# Patient Record
Sex: FEMALE | Race: WHITE | NOT HISPANIC OR LATINO | Employment: FULL TIME | URBAN - METROPOLITAN AREA
[De-identification: names, ages, dates, MRNs, and addresses within clinical notes are randomized per-mention and may not be internally consistent; named-entity substitution may affect disease eponyms.]

---

## 2019-09-05 ENCOUNTER — HOSPITAL ENCOUNTER (OUTPATIENT)
Dept: OBGYN | Facility: HOSPITAL | Age: 29
Discharge: ANOTHER HOSPITAL | End: 2019-09-05
Attending: OBSTETRICS & GYNECOLOGY | Admitting: OBSTETRICS & GYNECOLOGY

## 2019-09-05 ASSESSMENT — MIFFLIN-ST. JEOR: SCORE: 1654.83

## 2019-11-08 ENCOUNTER — ANESTHESIA - HEALTHEAST (OUTPATIENT)
Dept: OBGYN | Facility: CLINIC | Age: 29
End: 2019-11-08

## 2019-11-10 ENCOUNTER — HOME CARE/HOSPICE - HEALTHEAST (OUTPATIENT)
Dept: HOME HEALTH SERVICES | Facility: HOME HEALTH | Age: 29
End: 2019-11-10

## 2019-11-11 ENCOUNTER — HOME CARE/HOSPICE - HEALTHEAST (OUTPATIENT)
Dept: HOME HEALTH SERVICES | Facility: HOME HEALTH | Age: 29
End: 2019-11-11

## 2019-11-16 ENCOUNTER — AMBULATORY - HEALTHEAST (OUTPATIENT)
Dept: PEDIATRICS | Facility: CLINIC | Age: 29
End: 2019-11-16

## 2021-05-01 ENCOUNTER — HEALTH MAINTENANCE LETTER (OUTPATIENT)
Age: 31
End: 2021-05-01

## 2021-05-26 VITALS
DIASTOLIC BLOOD PRESSURE: 74 MMHG | SYSTOLIC BLOOD PRESSURE: 120 MMHG | HEART RATE: 86 BPM | TEMPERATURE: 98.4 F | RESPIRATION RATE: 18 BRPM

## 2021-06-03 VITALS — HEIGHT: 65 IN | WEIGHT: 207 LBS | BODY MASS INDEX: 34.49 KG/M2

## 2021-06-03 NOTE — ANESTHESIA PREPROCEDURE EVALUATION
Anesthesia Evaluation      Patient summary reviewed   No history of anesthetic complications     Airway   Mallampati: II  Neck ROM: full   Pulmonary - negative ROS   (-) not a smoker                         Cardiovascular - negative ROS  Exercise tolerance: > or = 4 METS   Neuro/Psych - negative ROS   (+) anxiety/panic attacks,     Endo/Other    (+) obesity, pregnant     Comments: Scoliosis.    GI/Hepatic/Renal - negative ROS           Dental                         Anesthesia Plan  Planned anesthetic: epidural  LE  ASA 3     Anesthetic plan and risks discussed with: patient and spouse    Post-op plan: routine recovery

## 2021-06-03 NOTE — ANESTHESIA PROCEDURE NOTES
Epidural Block    Patient location during procedure: OB  Time Called: 11/8/2019 1:00 PM  Reason for Block:labor epidural  Staffing:  Performing  Anesthesiologist: Jihan Coulter MD  Preanesthetic Checklist  Completed: patient identified, risks, benefits, and alternatives discussed, timeout performed, consent obtained, at patient's request, airway assessed, oxygen available, suction available, emergency drugs available and hand hygiene performed  Procedure  Patient position: sitting  Prep: ChloraPrep  Patient monitoring: continuous pulse oximetry, heart rate and blood pressure  Approach: midline  Location: L3-L4  Injection technique: YELITZA air  Number of Attempts:2  Needle  Needle type: Hook Mobilescarlett   Needle gauge: 18 G     Catheter in Space: 6  Assessment  Sensory level:  No complications      Additional Notes:  Patient was increasingly uncomfortable on L&D. Offered to replace epidural with the understanding that the replacement may work better, worse, or no change. RBA discussed again. Patient expressed understanding and wanted to try another epidural. Original epidural pulled with tip intact. Initial attempt L5/S1 not successful. L3-L4 was ultimately successful. Challenging epidural as patient has known scoliosis and back is overtly twisted.

## 2021-06-03 NOTE — ANESTHESIA PROCEDURE NOTES
Epidural Block    Patient location during procedure: OB  Time Called: 11/8/2019 4:48 AM  Reason for Block:labor epidural  Staffing:  Performing  Anesthesiologist: Bjorn Ramírez MD  Preanesthetic Checklist  Completed: patient identified, risks, benefits, and alternatives discussed, timeout performed, consent obtained, at patient's request, airway assessed, oxygen available, suction available, emergency drugs available and hand hygiene performed  Procedure  Patient position: sitting  Prep: ChloraPrep  Patient monitoring: continuous pulse oximetry, heart rate and blood pressure  Approach: midline  Location: L3-L4  Injection technique: YELITZA saline  Number of Attempts:1  Needle  Needle type: OpenLogicscarlett   Needle gauge: 18 G     Catheter in Space: 5 (8 cm to EDS)  Assessment  Sensory level:  No complications      Additional Notes:  By history and imaging, 42 degrees of lumbar and thoracic scoliosis.

## 2021-06-03 NOTE — ANESTHESIA POSTPROCEDURE EVALUATION
Patient: Emiliana Prakash  * No procedures listed *  Anesthesia type: No value filed.    Patient location: PACU  Last vitals: No vitals data found for the desired time range.    Post vital signs: stable  Level of consciousness: awake and responds to simple questions  Post-anesthesia pain: pain controlled  Post-anesthesia nausea and vomiting: no  Pulmonary: unassisted, return to baseline  Cardiovascular: stable and blood pressure at baseline  Hydration: adequate  Anesthetic events: no    QCDR Measures:  ASA# 11 - Crystal-op Cardiac Arrest: ASA11B - Patient did NOT experience unanticipated cardiac arrest  ASA# 12 - Crystal-op Mortality Rate: ASA12B - Patient did NOT die  ASA# 13 - PACU Re-Intubation Rate: ASA13B - Patient did NOT require a new airway mgmt  ASA# 10 - Composite Anes Safety: ASA10A - No serious adverse event    Additional Notes:

## 2021-06-03 NOTE — PROGRESS NOTES
"[facility] Lactation Visit    Assessment:    1.  difficulty in feeding at breast       Carla was very sleepy in the office today and was not motivated to eat. She attempted to latch both with and without a nipple shield but she was not rooting or opening her mouth. Discussed with parents that they may want to make another appointment to try and catch her when she is more awake and we can try again to get her to latch on. Her growth over the past 5 days has been appropriate and she is back to her birth weight. Mom also pumped in the office today and was able to pump 80 ml which is appropriate for 8 days post partum. I believe that with more consistent nursing/ pumping she will make a full milk supply. Mom also somewhat overwhelmed today as attempting to nurse and pump has been very challenging; encouraged her to look at the \"big picture\" and she can reassess in a few days what her goals are and what makes sense for her in terms of breastfeeding/ pumping.       Plan:    Continue to offer the breast as often as is right for you. You can try holding her skin to skin when she's getting close to a feeding time. When she starts showing cues you can move her into the nursing position and then make a breast \"sandwich\". Use the nipple shield as needed. You can start without it and then add it in as needed.     Offer both sides every time, and alternate which breast you start on. Latch baby deeply by making a \"breast sandwich,\" and aim your nipple for the roof of the mouth. If baby's lips are rolled inward, flip the top lip out with your finger, and then apply gentle downward pressure to the chin to help the lips flange out like \"fish lips.\" If you have pain that lasts beyond the initial latch-on, always restart. When sucking/swallowing frequency starts to slow down, do breast compressions/massage and tickle baby's feet to keep them alert with feeding. A diaper change between sides can be helpful to keep " "them alert.    Supplementation plan: Her weight gain has been appropriate, continue to offer her bottles of expressed breast milk or formula as needed based on her cues.         Recommended to pump: For a full milk supply, try to have breast stimulation by nursing or pumping 8 times per day - balance this with other priorities like resting and taking care of yourself.   Continue to monitor output, expect at least 6 wet diapers per day.   Recommended Vitamin D 400 IU daily.        Follow up as needed for another lactation appointment and we can try to get her to latch on when she's more awake. With pediatrician this week for weight check.     Average Infant Milk Intake by Age    Age Average milk volume per feeding (mL) Average milk volume per feeding (oz) Average 24 hour milk intake (mL) Average 24 hour milk intake (oz)   Day 1 Few drops - 5mL < tsp Up to 30 mL Up to 1 oz   Day 2 5 - 15 mL <0.5 oz - 1 TB 30 - 120 mL 1 - 4 oz   Day 3 15 - 30 mL  0.5 - 1 oz 120 - 240 mL 4 - 8 oz   Day 4 30 - 45 mL  1 - 1.5 oz 240 - 360 mL 8 - 12 oz   Day 5-7 45 - 60 mL 1.5 - 2 oz 360 - 600 mL 12 - 18 oz   Week 2-3 60 - 90 mL 2 - 3 oz 450 - 750 mL 15 - 25 oz   Months 1-6 90 - 150 mL 3 - 5 oz 750 - 1035 mL 25 - 35 oz         Tips for Exclusive Pumping:     -Pump 8-12x/day to \"dial in your order\" until your milk supply regulates, usually this occurs between 6 and 12 weeks. The interval of time between pump sessions is less important than the total number of times per day that you pump.     -To create and maintain a robust milk supply, pump at least once overnight. You can drink 2 big glasses of water before you go to sleep so that your bladder will wake you up. Pump after you get up to go to the bathroom.     -Once your milk supply regulates, you can try dropping pump sessions and still maintain your milk supply. You can try dropping one pump per month and see how it goes. The following chart below can help you determine how many times " "per day you need to pump in order to maintain your milk supply, AFTER your supply has regulated.    How many times per day do I need to pump?      Smallest Capacity Small Capacity Medium Capacity Large Capacity Largest Capacity   Max pump yield (if you make =>) 1-2 oz per pump 2-3 oz per pump 3-5 oz per pump 5-9 oz per pump 10-12 oz per pump   To increase milk pump => >12 x/day 10-11 x/day 8-10x/day 6-8x/day 4-5x/day   To maintain milk pump => 8x/day 7x/day 6x/day 5x/day 3-4x/day   To decrease milk pump=> 7x/day 6x/day 4-5x/day 3x/day 2x/day   Max time between pumping 4-5 hours 6-7 hours 7-8 hours 8-10 hours 10-12 hours     -Signs your milk supply has regulated: Breasts feel \"soft\" or \"empty,\" breasts stop leaking between nursing or pump sessions, you stop feeling let-downs or feel them less (though some women never feel them and that is normal). This is a sign that your milk supply is switching from being hormonally driven to being driven by autocrine control (supply and demand - driven by breast emptying).     -To encourage your body to make a good amount of milk, pump until your breasts are empty. This may take several letdowns. Some women find they need to pump for 30 minutes + to fully empty their breasts.     -To cut down on dishes, you can rinse your pump parts after pumping and them in a bag or tupperware container and store them in the fridge between pumping sessions, washing them well twice per day.     -There are hands-free pumping bras available that can hold your pump parts in place. This way you can be hands-free while you pump, or you can do hands-on pumping with good massage.     -There are several portable pumps available that can allow you to move about while you pump. Baby Buddha is a good one, though there are many other options. Freemies are a bottle and flange in one that you can wear under your shirt and pump discreetly. Freemies can either be closed system or open system; make sure you buy " the correct one for the portable pump you own. Medela pumps are open system, baby buddha and spectra s9 are closed system.     [email or web address]  [email or web address]    -You can use coconut oil to lubricate the inside of your pump flanges to decrease friction with pumping. If you are persistently sore with pumping, however, be sure that you are using the correct size flange.           SUBJECTIVE:     Carla is here today with mom and dad, Emiliana and Mohan, for lactation support. She is a 8 days female born at Gestational Age: 39w3d now 8 days.    She is doing well. She has gained 4.5 oz since last visit 5 days ago. She has gained approximately 1 oz per day over the past 5 days and is now 0% from birth weight.   .    Baby is attempting nursing 4-6 times per day. She last latched on two days ago but has had difficulty latching since birth.    Mother reports not being sure if she's hearing audible swallows.   Baby feeds about 8-10 times in 24 hours.   Baby is supplemented with expressed breast milk or formula, about 60-80 ml per feeding.   Mom is also pumping about 4-6 per day and gets about 40-60 ml per pumping session.  Number of wet diapers in 24 hours: 8+  Number of stools in 24 hours: 4  Color and consistency of stools: brown/yellow, seedy  Mom noticed her breasts grew larger and areolas darkened during pregnancy and she noticed primary engorgement when her milk came in on day 5.      Breastfeeding Goals: More nursing, less pumping.     Previous Breastfeeding Experience: First baby  Breast-surgery:  No   Maternal medications: Iron, mom had a hemorrhage at delivery, colace, ibuprofen and tylenol.     Hospital course:  NICU for rule out chorio for 2 days.     Results for orders placed or performed during the hospital encounter of 11/08/19   Culture, Blood Pediatric   Result Value Ref Range    Aerobic Blood Culture Bottle No Growth No Growth, No organisms seen, bottle returned to instrument, Specimen  not received, No Growth at 24 hours, No Growth at 120 hours, No Growth at 48 hours, No Growth at 72 hours, No Growth at 96 hours   Glucose, Serum   Result Value Ref Range    Glucose 68 44 - 98 mg/dL    Patient Fasting > 8hrs? Yes    [place] Metabolic Screen-prior to first transfusion/transport/on or before 3rd day of life.   Result Value Ref Range    Scan Result See Scanned Report    HM1 (CBC with Diff)   Result Value Ref Range    WBC 20.8 9.0 - 35.0 thou/uL    RBC 4.70 4.00 - 6.60 mill/uL    Hemoglobin 17.1 14.5 - 22.5 g/dL    Hematocrit 49.8 45.0 - 67.0 %     95 - 121 fL    MCH 36.4 31.0 - 37.0 pg    MCHC 34.3 29.0 - 37.0 g/dL    RDW 18.6 (H) 12.0 - 18.0 %    Platelets 251 140 - 440 thou/uL    MPV 8.9 8.5 - 12.5 fL   Manual Differential   Result Value Ref Range    Total Neutrophils % 62 32 - 62 %    Lymphocytes % 34 (H) 19 - 29 %    Monocytes % 3 (L) 5 - 7 %    Eosinophils %  0 0 - 2 %    Basophils % 1 0 - 1 %    Total Neutrophils Absolute 12.9 3.0 - 28.0 thou/ul    Lymphocytes Absolute 7.1 2.0 - 10.0 thou/uL    Monocytes Absolute 0.6 0.5 - 2.5 thou/uL    Eosinophils Absolute 0.0 0.0 - 0.7 thou/uL    Basophils Absolute 0.2 0.0 - 0.4 thou/uL    Manual nRBC per 100 Cells 5 0 - 10    Platelet Estimate Normal Normal   POCT Glucose - 90 minutes of life   Result Value Ref Range    Glucose 69 51 - 139 mg/dL       Current Outpatient Medications:      cholecalciferol, vitamin D3, 10 mcg/mL (400 unit/mL) Drop drops, Take 400 Units by mouth., Disp: , Rfl:   History reviewed. No pertinent past medical history.  History reviewed. No pertinent surgical history.  History reviewed. No pertinent family history.      Primary care provider: Chantal Hurtado DO    OBJECTIVE:    Mother:   Nipples are everted, the areola is compressible, the breast is soft and full.     Sore nipples: None   Maternal depression screening: Referral to maternal PCP  EPDS: Referral to maternal PCP made  Mom will follow up in 2 weeks if not feeling  better    Infant:     Age today: 8 days    Vitals:    11/16/19 1018   Pulse: 152   Resp: 48   Temp: 99  F (37.2  C)   SpO2: 99%         Weight:   Wt Readings from Last 3 Encounters:   11/16/19 7 lb 10.4 oz (3.47 kg) (49 %, Z= -0.03)*   11/11/19 7 lb 6 oz (3.345 kg) (52 %, Z= 0.04)*   11/10/19 7 lb 8.1 oz (3.404 kg) (59 %, Z= 0.23)*     * Growth percentiles are based on WHO (Girls, 0-2 years) data.       Birthweight:  7 lb 10.8 oz (3.48 kg).   Today's weight:    Vitals:    11/16/19 1018   Weight: 7 lb 10.4 oz (3.47 kg)   . This is 0% from birth weight.         Test weights:   No latch achieved    Feeding assessment:     Digital suck assessment:  Infant draws consultant's finger into mouth, palate intact, tongue over gums, normal frenulum.     PHYSICAL EXAM    Gen: Alert, no acute distress.   Head: Anterior fontanelle flat and soft.   Mouth:Lips pink. Oral mucosa moist. Tongue midline (good lateralization, movement, and lift; able to extend pass lower gumline).  Palate intact. Coordinated suck.  Lungs: Clear to auscultation bilaterally.   Cardiac: Regular regular rate and rhythm, S1S2, no murmurs.  Abdomen: Soft, nontender, bowel sounds present, no hepatosplenomegaly or mass palpable. Umbilicus dry with no erythema or drainage.   : Domignuez stage 1 female genitalia  Skin: Intact, dry, appropriate coloring for ethnicity, no jaundice.   Neuro: Appropriate muscle tone.    The visit lasted a total of 60 minutes that I spent face to face with the patient. Of that time, 60 minutes were spent on lactation. Over 50% of the time was spent counseling and educating the patient about feeding difficulties and lactation concerns.     Completed by:   JUAN Olmstead, IBCLC, [facility], Pediatrics.  11/16/2019 9:02 AM

## 2021-07-03 NOTE — ADDENDUM NOTE
Addendum Note by Jihan Coulter MD at 11/25/2019 10:15 PM     Author: Jihan Coulter MD Service: -- Author Type: Physician    Filed: 11/25/2019 10:15 PM Date of Service: 11/25/2019 10:15 PM Status: Signed    : Jihan Coulter MD (Physician)       Addendum  created 11/25/19 2215 by Jihan Coulter MD    Attestation recorded in Intraprocedure, Child order released for a procedure order, Intraprocedure Attestations filed, Intraprocedure Staff edited, Order Canceled from Note

## 2021-07-14 PROBLEM — Z34.90 PREGNANT: Status: RESOLVED | Noted: 2019-11-08 | Resolved: 2019-11-10

## 2021-10-11 ENCOUNTER — HEALTH MAINTENANCE LETTER (OUTPATIENT)
Age: 31
End: 2021-10-11

## 2021-12-30 LAB
ABO (EXTERNAL): NORMAL
HEMOGLOBIN (EXTERNAL): 13.7 G/DL (ref 9–14)
HEPATITIS B SURFACE ANTIGEN (EXTERNAL): NEGATIVE
HIV1+2 AB SERPL QL IA: NEGATIVE
RH (EXTERNAL): POSITIVE
RUBELLA ANTIBODY IGG (EXTERNAL): NORMAL
TREPONEMA PALLIDUM ANTIBODY (EXTERNAL): NEGATIVE

## 2022-05-22 ENCOUNTER — HEALTH MAINTENANCE LETTER (OUTPATIENT)
Age: 32
End: 2022-05-22

## 2022-07-15 LAB — GROUP B STREPTOCOCCUS (EXTERNAL): NEGATIVE

## 2022-08-02 DIAGNOSIS — Z34.90 ENCOUNTER FOR INDUCTION OF LABOR: Primary | ICD-10-CM

## 2022-08-04 ENCOUNTER — HOSPITAL ENCOUNTER (OUTPATIENT)
Facility: HOSPITAL | Age: 32
Discharge: HOME | End: 2022-08-05
Attending: REGISTERED NURSE | Admitting: ADVANCED PRACTICE MIDWIFE
Payer: COMMERCIAL

## 2022-08-04 RX ORDER — ASPIRIN 81 MG/1
81 TABLET ORAL DAILY
Status: ON HOLD | COMMUNITY
End: 2022-08-07

## 2022-08-04 RX ORDER — CITALOPRAM HYDROBROMIDE 20 MG/1
20 TABLET ORAL DAILY
COMMUNITY

## 2022-08-04 RX ORDER — PRENATAL VIT/IRON FUM/FOLIC AC 27MG-0.8MG
1 TABLET ORAL DAILY
COMMUNITY

## 2022-08-05 VITALS
RESPIRATION RATE: 18 BRPM | WEIGHT: 223 LBS | DIASTOLIC BLOOD PRESSURE: 85 MMHG | TEMPERATURE: 98.2 F | BODY MASS INDEX: 37.15 KG/M2 | SYSTOLIC BLOOD PRESSURE: 137 MMHG | HEIGHT: 65 IN

## 2022-08-05 PROBLEM — Z36.89 ENCOUNTER FOR TRIAGE IN PREGNANT PATIENT: Status: ACTIVE | Noted: 2022-08-05

## 2022-08-05 PROCEDURE — 250N000013 HC RX MED GY IP 250 OP 250 PS 637: Performed by: ADVANCED PRACTICE MIDWIFE

## 2022-08-05 PROCEDURE — G0463 HOSPITAL OUTPT CLINIC VISIT: HCPCS

## 2022-08-05 RX ORDER — LIDOCAINE 40 MG/G
CREAM TOPICAL
Status: DISCONTINUED | OUTPATIENT
Start: 2022-08-05 | End: 2022-08-05 | Stop reason: HOSPADM

## 2022-08-05 RX ORDER — HYDROXYZINE HYDROCHLORIDE 50 MG/1
100 TABLET, FILM COATED ORAL ONCE
Status: COMPLETED | OUTPATIENT
Start: 2022-08-05 | End: 2022-08-05

## 2022-08-05 RX ADMIN — HYDROXYZINE HYDROCHLORIDE 100 MG: 50 TABLET ORAL at 01:30

## 2022-08-05 NOTE — DISCHARGE INSTRUCTIONS
Discharge Instruction for Undelivered Patients      You were seen for: Labor Assessment  We Consulted: Smita Joseph CNM   You had (Test or Medicine):cervical exam    Diet:   Drink 8 to 12 glasses of liquids (milk, juice, water) every day.     Activity:  Count fetal kicks everyday (see handout)     Call your provider if you notice:  Swelling in your face or increased swelling in your hands or legs.  Headaches that are not relieved by Tylenol (acetaminophen).  Changes in your vision (blurring: seeing spots or stars.)  Nausea (sick to your stomach) and vomiting (throwing up).   Weight gain of 5 pounds or more per week.  Heartburn that doesn't go away.  Signs of bladder infection: pain when you urinate (use the toilet), need to go more often and more urgently.  The bag of stroud (rupture of membranes) breaks, or you notice leaking in your underwear.  Bright red blood in your underwear.  Abdominal (lower belly) or stomach pain.  For first baby: Contractions (tightening) less than 5 minutes apart for one hour or more.  Second (plus) baby: Contractions (tightening) less than 10 minutes apart and getting stronger.  *If less than 34 weeks: Contractions (tightening) more than 6 times in one hour.  Increase or change in vaginal discharge (note the color and amount)    Follow-up:  {OB DC INST NOT ADMITTED OTHER:[id number]}

## 2022-08-05 NOTE — PROGRESS NOTES
Patient arrived to [facility] with her  at 2320 to r/o labor. She stated that contractions started around 1500 and were every 8 minutes apart. Upon arrival patient was placed on the monitor, category 1 tracing with contractions every 3-6 minutes palpating mild. Midwife on call updated on patient's arrival. Plan is to either discharge patient to home if she so chooses or recheck her cervix in 1-2 hours and discharge if no changes. At this point the patient wishes to walk around unit and have her cervix rechecked.

## 2022-08-05 NOTE — PROGRESS NOTES
"Outpatient/Triage Note:    Patient Name:  Emiliana Prakash  :      1990  MRN:      [id number]    Subjective:  Emiliana Prakash is a 32 year old G2*P1 at 40.1 weeks, who presented to [facility] (was diverted from ) for evaluation of contractions. Denies leaking of fluid, bleeding, or changes in fetal movement.     Objective:  Vital signs: Ht 1.651 m (5' 5\")   Wt 101.2 kg (223 lb)   BMI 37.11 kg/m    FHR: Category 1  Uterine contractions: Q3-6  NST: reactive    Physical Exam: A&Ox3  Abdomen: SIUP  SVE: 1.5/70/-2 per RN, same exam in clinic   Legs:moves freely    Results:  No results found for this or any previous visit (from the past 168 hour(s)).    Assessment:   @ 40w1d here for evaluation of contractions.     Plan:   -Offered recheck cervix in 1-2 hours vs discharge home with therapeutic rest, desires ambulation x 1-2 hours and reevaluating.   -No change on 2nd SVE. Discharge to home undelivered with therapeutic rest. Reviewed warning signs including decreased fetal movement, leaking of fluid, vaginal bleeding, or signs of labor. Reviewed how to contact on-call provider. Follow-up in clinic with OB provider as scheduled or sooner as needed. All questions answered. Agrees with plan.     Provider: CUATE Yanes CNM     "

## 2022-08-05 NOTE — PROGRESS NOTES
Patient's cervix unchanged on recheck. Vistaril given per orders and discharge instructions given, questions answered and understanding verbalized.

## 2022-08-06 ENCOUNTER — HOSPITAL ENCOUNTER (INPATIENT)
Facility: CLINIC | Age: 32
LOS: 1 days | Discharge: HOME | End: 2022-08-07
Attending: REGISTERED NURSE | Admitting: REGISTERED NURSE
Payer: COMMERCIAL

## 2022-08-06 PROBLEM — Z34.90 TERM PREGNANCY: Status: ACTIVE | Noted: 2022-08-06

## 2022-08-06 LAB
ABO/RH(D): NORMAL
ANTIBODY SCREEN: NEGATIVE
APTT PPP: 26 SECONDS (ref 22–38)
BASOPHILS # BLD AUTO: 0 10E3/UL (ref 0–0.2)
BASOPHILS NFR BLD AUTO: 0 %
D DIMER PPP FEU-MCNC: 2.76 UG/ML FEU (ref 0–0.5)
EOSINOPHIL # BLD AUTO: 0 10E3/UL (ref 0–0.7)
EOSINOPHIL NFR BLD AUTO: 0 %
ERYTHROCYTE [DISTWIDTH] IN BLOOD BY AUTOMATED COUNT: 14.2 % (ref 10–15)
FIBRINOGEN PPP-MCNC: 450 MG/DL (ref 170–490)
HCT VFR BLD AUTO: 36.3 % (ref 35–47)
HGB BLD-MCNC: 10.7 G/DL (ref 11.7–15.7)
HGB BLD-MCNC: 12 G/DL (ref 11.7–15.7)
HOLD SPECIMEN: NORMAL
IMM GRANULOCYTES # BLD: 0.1 10E3/UL
IMM GRANULOCYTES NFR BLD: 0 %
INR PPP: 1.05 (ref 0.85–1.15)
LYMPHOCYTES # BLD AUTO: 1.7 10E3/UL (ref 0.8–5.3)
LYMPHOCYTES NFR BLD AUTO: 12 %
MCH RBC QN AUTO: 28.2 PG (ref 26.5–33)
MCHC RBC AUTO-ENTMCNC: 33.1 G/DL (ref 31.5–36.5)
MCV RBC AUTO: 85 FL (ref 78–100)
MONOCYTES # BLD AUTO: 1.1 10E3/UL (ref 0–1.3)
MONOCYTES NFR BLD AUTO: 8 %
NEUTROPHILS # BLD AUTO: 11.3 10E3/UL (ref 1.6–8.3)
NEUTROPHILS NFR BLD AUTO: 80 %
NRBC # BLD AUTO: 0 10E3/UL
NRBC BLD AUTO-RTO: 0 /100
PLATELET # BLD AUTO: 341 10E3/UL (ref 150–450)
PLATELET # BLD AUTO: 369 10E3/UL (ref 150–450)
RBC # BLD AUTO: 4.25 10E6/UL (ref 3.8–5.2)
SARS-COV-2 RNA RESP QL NAA+PROBE: NEGATIVE
SPECIMEN EXPIRATION DATE: NORMAL
WBC # BLD AUTO: 14.2 10E3/UL (ref 4–11)

## 2022-08-06 PROCEDURE — 86780 TREPONEMA PALLIDUM: CPT | Performed by: REGISTERED NURSE

## 2022-08-06 PROCEDURE — 85049 AUTOMATED PLATELET COUNT: CPT | Performed by: REGISTERED NURSE

## 2022-08-06 PROCEDURE — 85018 HEMOGLOBIN: CPT | Performed by: REGISTERED NURSE

## 2022-08-06 PROCEDURE — 85384 FIBRINOGEN ACTIVITY: CPT | Performed by: REGISTERED NURSE

## 2022-08-06 PROCEDURE — 999N000016 HC STATISTIC ATTENDANCE AT DELIVERY

## 2022-08-06 PROCEDURE — 120N000001 HC R&B MED SURG/OB

## 2022-08-06 PROCEDURE — 250N000013 HC RX MED GY IP 250 OP 250 PS 637: Performed by: REGISTERED NURSE

## 2022-08-06 PROCEDURE — 36415 COLL VENOUS BLD VENIPUNCTURE: CPT | Performed by: REGISTERED NURSE

## 2022-08-06 PROCEDURE — 999N000157 HC STATISTIC RCP TIME EA 10 MIN

## 2022-08-06 PROCEDURE — C9803 HOPD COVID-19 SPEC COLLECT: HCPCS

## 2022-08-06 PROCEDURE — 85025 COMPLETE CBC W/AUTO DIFF WBC: CPT | Performed by: REGISTERED NURSE

## 2022-08-06 PROCEDURE — 85379 FIBRIN DEGRADATION QUANT: CPT | Performed by: REGISTERED NURSE

## 2022-08-06 PROCEDURE — 250N000011 HC RX IP 250 OP 636: Performed by: REGISTERED NURSE

## 2022-08-06 PROCEDURE — 10907ZC DRAINAGE OF AMNIOTIC FLUID, THERAPEUTIC FROM PRODUCTS OF CONCEPTION, VIA NATURAL OR ARTIFICIAL OPENING: ICD-10-PCS | Performed by: REGISTERED NURSE

## 2022-08-06 PROCEDURE — 722N000001 HC LABOR CARE VAGINAL DELIVERY SINGLE

## 2022-08-06 PROCEDURE — 85730 THROMBOPLASTIN TIME PARTIAL: CPT | Performed by: REGISTERED NURSE

## 2022-08-06 PROCEDURE — 0HQ9XZZ REPAIR PERINEUM SKIN, EXTERNAL APPROACH: ICD-10-PCS | Performed by: REGISTERED NURSE

## 2022-08-06 PROCEDURE — 86850 RBC ANTIBODY SCREEN: CPT | Performed by: REGISTERED NURSE

## 2022-08-06 PROCEDURE — U0003 INFECTIOUS AGENT DETECTION BY NUCLEIC ACID (DNA OR RNA); SEVERE ACUTE RESPIRATORY SYNDROME CORONAVIRUS 2 (SARS-COV-2) (CORONAVIRUS DISEASE [COVID-19]), AMPLIFIED PROBE TECHNIQUE, MAKING USE OF HIGH THROUGHPUT TECHNOLOGIES AS DESCRIBED BY CMS-2020-01-R: HCPCS | Performed by: REGISTERED NURSE

## 2022-08-06 PROCEDURE — 86901 BLOOD TYPING SEROLOGIC RH(D): CPT | Performed by: REGISTERED NURSE

## 2022-08-06 PROCEDURE — 85610 PROTHROMBIN TIME: CPT | Performed by: REGISTERED NURSE

## 2022-08-06 RX ORDER — CITRIC ACID/SODIUM CITRATE 334-500MG
30 SOLUTION, ORAL ORAL
Status: DISCONTINUED | OUTPATIENT
Start: 2022-08-06 | End: 2022-08-06 | Stop reason: HOSPADM

## 2022-08-06 RX ORDER — METHYLERGONOVINE MALEATE 0.2 MG/ML
200 INJECTION INTRAVENOUS
Status: DISCONTINUED | OUTPATIENT
Start: 2022-08-06 | End: 2022-08-06 | Stop reason: HOSPADM

## 2022-08-06 RX ORDER — ACETAMINOPHEN 325 MG/1
650 TABLET ORAL EVERY 4 HOURS PRN
Status: DISCONTINUED | OUTPATIENT
Start: 2022-08-06 | End: 2022-08-07 | Stop reason: HOSPADM

## 2022-08-06 RX ORDER — NALOXONE HYDROCHLORIDE 0.4 MG/ML
0.4 INJECTION, SOLUTION INTRAMUSCULAR; INTRAVENOUS; SUBCUTANEOUS
Status: DISCONTINUED | OUTPATIENT
Start: 2022-08-06 | End: 2022-08-06 | Stop reason: HOSPADM

## 2022-08-06 RX ORDER — IBUPROFEN 800 MG/1
800 TABLET, FILM COATED ORAL
Status: DISCONTINUED | OUTPATIENT
Start: 2022-08-06 | End: 2022-08-07 | Stop reason: HOSPADM

## 2022-08-06 RX ORDER — OXYCODONE HYDROCHLORIDE 5 MG/1
5 TABLET ORAL EVERY 4 HOURS PRN
Status: DISCONTINUED | OUTPATIENT
Start: 2022-08-06 | End: 2022-08-07 | Stop reason: HOSPADM

## 2022-08-06 RX ORDER — NALOXONE HYDROCHLORIDE 0.4 MG/ML
0.2 INJECTION, SOLUTION INTRAMUSCULAR; INTRAVENOUS; SUBCUTANEOUS
Status: DISCONTINUED | OUTPATIENT
Start: 2022-08-06 | End: 2022-08-07 | Stop reason: HOSPADM

## 2022-08-06 RX ORDER — OXYTOCIN 10 [USP'U]/ML
10 INJECTION, SOLUTION INTRAMUSCULAR; INTRAVENOUS
Status: COMPLETED | OUTPATIENT
Start: 2022-08-06 | End: 2022-08-06

## 2022-08-06 RX ORDER — NALOXONE HYDROCHLORIDE 0.4 MG/ML
0.2 INJECTION, SOLUTION INTRAMUSCULAR; INTRAVENOUS; SUBCUTANEOUS
Status: DISCONTINUED | OUTPATIENT
Start: 2022-08-06 | End: 2022-08-06 | Stop reason: HOSPADM

## 2022-08-06 RX ORDER — KETOROLAC TROMETHAMINE 30 MG/ML
30 INJECTION, SOLUTION INTRAMUSCULAR; INTRAVENOUS
Status: DISCONTINUED | OUTPATIENT
Start: 2022-08-06 | End: 2022-08-07 | Stop reason: HOSPADM

## 2022-08-06 RX ORDER — NALOXONE HYDROCHLORIDE 0.4 MG/ML
0.4 INJECTION, SOLUTION INTRAMUSCULAR; INTRAVENOUS; SUBCUTANEOUS
Status: DISCONTINUED | OUTPATIENT
Start: 2022-08-06 | End: 2022-08-07 | Stop reason: HOSPADM

## 2022-08-06 RX ORDER — MISOPROSTOL 200 UG/1
800 TABLET ORAL
Status: DISCONTINUED | OUTPATIENT
Start: 2022-08-06 | End: 2022-08-06 | Stop reason: HOSPADM

## 2022-08-06 RX ORDER — CARBOPROST TROMETHAMINE 250 UG/ML
250 INJECTION, SOLUTION INTRAMUSCULAR
Status: DISCONTINUED | OUTPATIENT
Start: 2022-08-06 | End: 2022-08-07 | Stop reason: HOSPADM

## 2022-08-06 RX ORDER — OXYTOCIN/0.9 % SODIUM CHLORIDE 30/500 ML
340 PLASTIC BAG, INJECTION (ML) INTRAVENOUS CONTINUOUS PRN
Status: DISCONTINUED | OUTPATIENT
Start: 2022-08-06 | End: 2022-08-07 | Stop reason: HOSPADM

## 2022-08-06 RX ORDER — MISOPROSTOL 200 UG/1
400 TABLET ORAL
Status: DISCONTINUED | OUTPATIENT
Start: 2022-08-06 | End: 2022-08-07 | Stop reason: HOSPADM

## 2022-08-06 RX ORDER — MISOPROSTOL 200 UG/1
800 TABLET ORAL
Status: DISCONTINUED | OUTPATIENT
Start: 2022-08-06 | End: 2022-08-07 | Stop reason: HOSPADM

## 2022-08-06 RX ORDER — HYDROCORTISONE 25 MG/G
CREAM TOPICAL 3 TIMES DAILY PRN
Status: DISCONTINUED | OUTPATIENT
Start: 2022-08-06 | End: 2022-08-07 | Stop reason: HOSPADM

## 2022-08-06 RX ORDER — SODIUM CHLORIDE, SODIUM LACTATE, POTASSIUM CHLORIDE, CALCIUM CHLORIDE 600; 310; 30; 20 MG/100ML; MG/100ML; MG/100ML; MG/100ML
INJECTION, SOLUTION INTRAVENOUS CONTINUOUS
Status: DISCONTINUED | OUTPATIENT
Start: 2022-08-06 | End: 2022-08-07 | Stop reason: HOSPADM

## 2022-08-06 RX ORDER — ONDANSETRON 4 MG/1
4 TABLET, ORALLY DISINTEGRATING ORAL EVERY 6 HOURS PRN
Status: DISCONTINUED | OUTPATIENT
Start: 2022-08-06 | End: 2022-08-06 | Stop reason: HOSPADM

## 2022-08-06 RX ORDER — OXYTOCIN/0.9 % SODIUM CHLORIDE 30/500 ML
100-340 PLASTIC BAG, INJECTION (ML) INTRAVENOUS CONTINUOUS PRN
Status: DISCONTINUED | OUTPATIENT
Start: 2022-08-06 | End: 2022-08-07 | Stop reason: HOSPADM

## 2022-08-06 RX ORDER — MODIFIED LANOLIN
OINTMENT (GRAM) TOPICAL
Status: DISCONTINUED | OUTPATIENT
Start: 2022-08-06 | End: 2022-08-07 | Stop reason: HOSPADM

## 2022-08-06 RX ORDER — FENTANYL CITRATE 50 UG/ML
100 INJECTION, SOLUTION INTRAMUSCULAR; INTRAVENOUS
Status: DISCONTINUED | OUTPATIENT
Start: 2022-08-06 | End: 2022-08-06 | Stop reason: HOSPADM

## 2022-08-06 RX ORDER — PROCHLORPERAZINE 25 MG
25 SUPPOSITORY, RECTAL RECTAL EVERY 12 HOURS PRN
Status: DISCONTINUED | OUTPATIENT
Start: 2022-08-06 | End: 2022-08-06 | Stop reason: HOSPADM

## 2022-08-06 RX ORDER — OXYTOCIN 10 [USP'U]/ML
10 INJECTION, SOLUTION INTRAMUSCULAR; INTRAVENOUS
Status: DISCONTINUED | OUTPATIENT
Start: 2022-08-06 | End: 2022-08-06 | Stop reason: HOSPADM

## 2022-08-06 RX ORDER — METOCLOPRAMIDE HYDROCHLORIDE 5 MG/ML
10 INJECTION INTRAMUSCULAR; INTRAVENOUS EVERY 6 HOURS PRN
Status: DISCONTINUED | OUTPATIENT
Start: 2022-08-06 | End: 2022-08-06 | Stop reason: HOSPADM

## 2022-08-06 RX ORDER — IBUPROFEN 800 MG/1
800 TABLET, FILM COATED ORAL EVERY 6 HOURS PRN
Status: DISCONTINUED | OUTPATIENT
Start: 2022-08-06 | End: 2022-08-07 | Stop reason: HOSPADM

## 2022-08-06 RX ORDER — PROCHLORPERAZINE MALEATE 10 MG
10 TABLET ORAL EVERY 6 HOURS PRN
Status: DISCONTINUED | OUTPATIENT
Start: 2022-08-06 | End: 2022-08-06 | Stop reason: HOSPADM

## 2022-08-06 RX ORDER — BISACODYL 10 MG
10 SUPPOSITORY, RECTAL RECTAL DAILY PRN
Status: DISCONTINUED | OUTPATIENT
Start: 2022-08-06 | End: 2022-08-07 | Stop reason: HOSPADM

## 2022-08-06 RX ORDER — METHYLERGONOVINE MALEATE 0.2 MG/ML
200 INJECTION INTRAVENOUS
Status: DISCONTINUED | OUTPATIENT
Start: 2022-08-06 | End: 2022-08-07 | Stop reason: HOSPADM

## 2022-08-06 RX ORDER — MISOPROSTOL 200 UG/1
400 TABLET ORAL
Status: DISCONTINUED | OUTPATIENT
Start: 2022-08-06 | End: 2022-08-06 | Stop reason: HOSPADM

## 2022-08-06 RX ORDER — ONDANSETRON 2 MG/ML
4 INJECTION INTRAMUSCULAR; INTRAVENOUS EVERY 6 HOURS PRN
Status: DISCONTINUED | OUTPATIENT
Start: 2022-08-06 | End: 2022-08-06 | Stop reason: HOSPADM

## 2022-08-06 RX ORDER — CARBOPROST TROMETHAMINE 250 UG/ML
250 INJECTION, SOLUTION INTRAMUSCULAR
Status: DISCONTINUED | OUTPATIENT
Start: 2022-08-06 | End: 2022-08-06 | Stop reason: HOSPADM

## 2022-08-06 RX ORDER — DOCUSATE SODIUM 100 MG/1
100 CAPSULE, LIQUID FILLED ORAL DAILY
Status: DISCONTINUED | OUTPATIENT
Start: 2022-08-06 | End: 2022-08-07 | Stop reason: HOSPADM

## 2022-08-06 RX ORDER — METOCLOPRAMIDE 10 MG/1
10 TABLET ORAL EVERY 6 HOURS PRN
Status: DISCONTINUED | OUTPATIENT
Start: 2022-08-06 | End: 2022-08-06 | Stop reason: HOSPADM

## 2022-08-06 RX ORDER — OXYTOCIN 10 [USP'U]/ML
10 INJECTION, SOLUTION INTRAMUSCULAR; INTRAVENOUS
Status: DISCONTINUED | OUTPATIENT
Start: 2022-08-06 | End: 2022-08-07 | Stop reason: HOSPADM

## 2022-08-06 RX ORDER — OXYTOCIN/0.9 % SODIUM CHLORIDE 30/500 ML
340 PLASTIC BAG, INJECTION (ML) INTRAVENOUS CONTINUOUS PRN
Status: DISCONTINUED | OUTPATIENT
Start: 2022-08-06 | End: 2022-08-06 | Stop reason: HOSPADM

## 2022-08-06 RX ADMIN — OXYTOCIN 10 UNITS: 10 INJECTION, SOLUTION INTRAMUSCULAR; INTRAVENOUS at 13:35

## 2022-08-06 RX ADMIN — IBUPROFEN 800 MG: 800 TABLET ORAL at 20:29

## 2022-08-06 RX ADMIN — MISOPROSTOL 800 MCG: 200 TABLET ORAL at 14:15

## 2022-08-06 RX ADMIN — DOCUSATE SODIUM 100 MG: 100 CAPSULE, LIQUID FILLED ORAL at 20:29

## 2022-08-06 ASSESSMENT — ACTIVITIES OF DAILY LIVING (ADL)
ADLS_ACUITY_SCORE: 18
WALKING_OR_CLIMBING_STAIRS_DIFFICULTY: NO
DRESSING/BATHING_DIFFICULTY: NO
CHANGE_IN_FUNCTIONAL_STATUS_SINCE_ONSET_OF_CURRENT_ILLNESS/INJURY: NO
ADLS_ACUITY_SCORE: 18
ADLS_ACUITY_SCORE: 18
DIFFICULTY_EATING/SWALLOWING: NO
TOILETING_ISSUES: NO
WEAR_GLASSES_OR_BLIND: NO
ADLS_ACUITY_SCORE: 18
CONCENTRATING,_REMEMBERING_OR_MAKING_DECISIONS_DIFFICULTY: NO
ADLS_ACUITY_SCORE: 18
ADLS_ACUITY_SCORE: 18
FALL_HISTORY_WITHIN_LAST_SIX_MONTHS: NO
DOING_ERRANDS_INDEPENDENTLY_DIFFICULTY: NO

## 2022-08-06 NOTE — PLAN OF CARE
Problem: Plan of Care - These are the overarching goals to be used throughout the patient stay.    Goal: Optimal Comfort and Wellbeing  Outcome: Ongoing, Progressing     Problem: Change in Fetal Wellbeing (Labor)  Goal: Stable Fetal Wellbeing  Outcome: Ongoing, Progressing

## 2022-08-06 NOTE — L&D DELIVERY NOTE
Vaginal Delivery Note    Name: Emiliana Prakash  : 1990  MRN: [id number]    PRE DELIVERY DIAGNOSIS  32 year old  2 Para 1001 at 40w2d     1) BMI 34  2) History of PPH  3) Scoliosis  4) Anxiety  5) Migraines  6) COVID + 2022      POST DELIVERY DIAGNOSIS  1) 32 year old  @ 40w2d  2) Delivery of a viable infant weighing 8lb3oz   via shoulder dystocia delivery    YOB: 2023     Birth Time: 1:32 PM       Augmentation No              Indication:   Induction No                      Indication:     Monitors: External, IA     GBS: Negative    ROM: AROM with   Fluid Type: Clear    Labor Analgesia/Anesthesia:Nitrous oxide    Cord pH obtained: Yes  Placenta Date/Time: 2022  1:40 PM   Placenta submitted to Pathology: No    Description of procedure:   32 year old  with PNC w/ MNWC and pregnancy complicated by the above list presented to L&D with labor contractions.  Her hospital course was complicated by shoulder dystocia..  Patient progressed to complete with spontaneous labor onset. She arrived at 6cm and quickly became uncomfortable with transition. She labored to 9cm in the tub and then began to spontaneously have the urge to push. She pushed effectively in a few different positions, ultimately AROM of BBOW just prior to . The head delivered slowly in KEKE with the chin tucked tightly to the perineum. The anterior shoulder did not deliver with gentle downward traction. Pushing efforts were stopped while McRobert's and suprapubic pressure were applied. Simultaneously called for SCN team, back up support and recorder. The head delivered with suprapubic and gentle Jaime maneuver. Cord clamped x2 and cut per RN request just prior to 1 minute. Taken to warmer and awaiting SCN team. Cord gases were collected. Emiliana and Mohan welcomed their son, Alban.    Shoulder Dystocia Yes  Operative Vaginal Delivery No  Infant spontaneously delivered over an 1st degree laceration.   It  was repaired in the normal fashion using local anesthesia using 3-0 vicryl.  Infant delivered in KEKE position.  Nuchal cord No     Placenta spontaneously delivered: 8/6/2022  1:40 PM  grossly intact with 3 vessel cord.  Infant:  Live, lethargic infant  was handed to nurse.    Delivery was complicated by postpartum hemorrhage and shoulder dystocia Interventions included fundal massage, pitocin and cytotec. Majority of PP bleeding occurred Immediately following delivery of placenta, quickly resolved with PP hemorrhage management steps. Stable throughout remainder of third stage.     Delivery QBL (mL): 1093    Mother and Infant stable.    Karo Castro CNM    8/6/2022 2:24 PM

## 2022-08-06 NOTE — PROGRESS NOTES
RT attended delivery. Baby needed assisted breaths briefly. Pt came around and we did CPAP briefly. We were able to wean oxygen. Baby doing well. RT was dismissed.    Ruth Jackson, RT

## 2022-08-06 NOTE — LACTATION NOTE
This note was copied from a baby's chart.  Referred to Emiliana for a a brief assessment of baby's tongue. Mohan reported that they had many struggles with breastfeeding their first child. A tongue tie was discovered by a provider at 1years of age. With a gloved finger a suck assessment was done. Baby was able to suck but in a choppy motion. With gentle pressure forward on her chin, suck training was done with Mohan present. Parents were encouraged to try training her tongue to stay forward at the breast. At time of this visit, Emiliana was in the tub so a feeding was not initiated. To follow up with family regarding a feeding observation if still inpt on 8-8-22

## 2022-08-06 NOTE — H&P
"HISTORY AND PHYSICAL UPDATE ADMISSION EXAM    Name: Emiliana Prakash  YOB: 1990  Medical Record Number: [id number]    History of Present Illness: Emiliana Prakash is a 32 year old female who is 40w2d pregnant and being admitted for active labor management. Supported by , Mohan and Dione lakahni.     Estimated Date of Delivery: Aug 4, 2022    EGA 40w2d    OB History    Para Term  AB Living   2 1 1 0 0 1   SAB IAB Ectopic Multiple Live Births   0 0 0 0 1      # Outcome Date GA Lbr Primo/2nd Weight Sex Delivery Anes PTL Lv   2 Current            1 Term 19 39w3d  3.48 kg (7 lb 10.8 oz) M Vag-Spont EPI, Nitrous N TIM      Complications: Other Excessive Bleeding, Chorioamnionitis      Name: ALBERFEMALE-EMILIANA      Apgar1: 8  Apgar5: 9          Prenatal Complications:   1) BMI 34  2) History of PPH  3) Scoliosis  4) Anxiety  5) Migraines  6) COVID + 2022    Exam:      BP (!) 142/92 (BP Location: Right arm, Patient Position: Semi-Unr's, Cuff Size: Adult Regular)   Pulse 104   Temp 97.9  F (36.6  C) (Oral)   Resp 16   Ht 1.651 m (5' 5\")   Wt 101.2 kg (223 lb)   BMI 37.11 kg/m      Fetal heart Rate Category 1, baseline 135, moderate variability, accelerations present, decelerations absent  Contractions 5-6 minutes    HEENT grossly normal  Neck: no lymphadenopathy or thryoidomegaly  Lungs CTAB  Heart RRR  ABD gravid, non-tender  EXT:  No edema, moves freely  Vaginal exam /-1, cephalic  Membranes: intact    Assessment: active labor management  Intermittent mild range BP elevation    Plan: Admit - see IP orders  Will collect pre-e lab panel and continue to monitor BP following admission. Had single elevation upon triage arrival a few days ago as well but otherwise has been normotensive.  Intermittent auscultation per protocol.  Pain medication as desired. Has private  as support. May use hydrotherapy  Anticipate   Active management of third stage given history of PPH, " consider TXA.     Prenatal record reviewed.    DEE Porter Dr. aware of patient status and remains available for consultation and collaboration as needed.  8/6/2022   12:00 PM

## 2022-08-07 VITALS
DIASTOLIC BLOOD PRESSURE: 78 MMHG | WEIGHT: 223 LBS | TEMPERATURE: 98.1 F | BODY MASS INDEX: 37.15 KG/M2 | RESPIRATION RATE: 18 BRPM | OXYGEN SATURATION: 100 % | HEART RATE: 78 BPM | SYSTOLIC BLOOD PRESSURE: 127 MMHG | HEIGHT: 65 IN

## 2022-08-07 LAB
HGB BLD-MCNC: 8.4 G/DL (ref 11.7–15.7)
T PALLIDUM AB SER QL: NONREACTIVE

## 2022-08-07 PROCEDURE — 36415 COLL VENOUS BLD VENIPUNCTURE: CPT | Performed by: REGISTERED NURSE

## 2022-08-07 PROCEDURE — 250N000013 HC RX MED GY IP 250 OP 250 PS 637: Performed by: REGISTERED NURSE

## 2022-08-07 PROCEDURE — 85018 HEMOGLOBIN: CPT | Performed by: REGISTERED NURSE

## 2022-08-07 RX ORDER — ACETAMINOPHEN 325 MG/1
650 TABLET ORAL EVERY 4 HOURS PRN
COMMUNITY
Start: 2022-08-07

## 2022-08-07 RX ORDER — FERROUS SULFATE 325(65) MG
325 TABLET ORAL
Qty: 30 TABLET | Refills: 1 | Status: SHIPPED | OUTPATIENT
Start: 2022-08-07

## 2022-08-07 RX ORDER — DOCUSATE SODIUM 100 MG/1
100 CAPSULE, LIQUID FILLED ORAL DAILY
COMMUNITY
Start: 2022-08-08

## 2022-08-07 RX ORDER — IBUPROFEN 800 MG/1
800 TABLET, FILM COATED ORAL EVERY 8 HOURS PRN
COMMUNITY
Start: 2022-08-07

## 2022-08-07 RX ADMIN — ACETAMINOPHEN 650 MG: 325 TABLET ORAL at 09:24

## 2022-08-07 RX ADMIN — DOCUSATE SODIUM 100 MG: 100 CAPSULE, LIQUID FILLED ORAL at 09:24

## 2022-08-07 RX ADMIN — IBUPROFEN 800 MG: 800 TABLET ORAL at 16:29

## 2022-08-07 RX ADMIN — Medication: at 05:40

## 2022-08-07 RX ADMIN — IBUPROFEN 800 MG: 800 TABLET ORAL at 05:25

## 2022-08-07 RX ADMIN — ACETAMINOPHEN 650 MG: 325 TABLET ORAL at 00:18

## 2022-08-07 RX ADMIN — BENZOCAINE AND LEVOMENTHOL: 200; 5 SPRAY TOPICAL at 00:31

## 2022-08-07 ASSESSMENT — ACTIVITIES OF DAILY LIVING (ADL)
ADLS_ACUITY_SCORE: 18

## 2022-08-07 NOTE — DISCHARGE SUMMARY
OB Discharge Summary      Date:  2022    Name:  Emiliana Prakash  :  1990  MRN:  [id number]      Admission Date:  2022  Delivery Date: 2022  Gestational Age at Delivery:  40w2d  Discharge Date:  2022    Principal Diagnosis:    Patient Active Problem List   Diagnosis     Encounter for triage in pregnant patient     Term pregnancy         Conditions complicating Pregnancy:  1) BMI 34  2) History of PPH  3) Scoliosis  4) Anxiety  5) Migraines  6) COVID + 2022    Procedure(s) Performed:  AROM with , shoulder dystocia, 1st degree lac, PPH    Indication for :  none  Indication for Induction:  none     Condition at Discharge:  stable    Discharge Medications:      Review of your medicines      START taking      Dose / Directions   acetaminophen 325 MG tablet  Commonly known as: TYLENOL  Used for:  (normal spontaneous vaginal delivery)      Dose: 650 mg  Take 2 tablets (650 mg) by mouth every 4 hours as needed for mild pain or fever (greater than or equal to 38  C /100.4  F (oral) or 38.5  C/ 101.4  F (core).)  Refills: 0     docusate sodium 100 MG capsule  Commonly known as: COLACE  Used for:  (normal spontaneous vaginal delivery)      Dose: 100 mg  Start taking on: 2022  Take 1 capsule (100 mg) by mouth daily  Refills: 0     ferrous sulfate 325 (65 Fe) MG tablet  Commonly known as: FEROSUL  Used for:  (normal spontaneous vaginal delivery)      Dose: 325 mg  Take 1 tablet (325 mg) by mouth daily (with breakfast)  Quantity: 30 tablet  Refills: 1     ibuprofen 800 MG tablet  Commonly known as: ADVIL/MOTRIN  Used for:  (normal spontaneous vaginal delivery)      Dose: 800 mg  Take 1 tablet (800 mg) by mouth every 8 hours as needed for other (cramping)  Refills: 0        CONTINUE these medicines which have NOT CHANGED      Dose / Directions   citalopram 20 MG tablet  Commonly known as: celeXA      Dose: 20 mg  Take 20 mg by mouth daily  Refills: 0     prenatal  multivitamin w/iron 27-0.8 MG tablet      Dose: 1 tablet  Take 1 tablet by mouth daily  Refills: 0        STOP taking    aspirin 81 MG EC tablet              Where to get your medicines      Some of these will need a paper prescription and others can be bought over the counter. Ask your nurse if you have questions.    Bring a paper prescription for each of these medications    ferrous sulfate 325 (65 Fe) MG tablet  You don't need a prescription for these medications    acetaminophen 325 MG tablet    docusate sodium 100 MG capsule    ibuprofen 800 MG tablet          Discharge Plan:    Follow up with /CNM:  Aware of PPD risk, she feels stable, will come to clinic prior to 6 weeks PP visit if needing resources   Patient Instructions:      Physical activity: as tolerated    Diet:  As tolerated    Medication:  See above        Physician/CNM:  CUATE Wolf CNM, Dr. remains available for consultation and collaboration as needed.      Name:  Emiliana Prakash  :  1990  MRN:  [id number]

## 2022-08-07 NOTE — PLAN OF CARE
Problem: Urinary Retention (Postpartum Vaginal Delivery)  Goal: Effective Urinary Elimination  Outcome: Ongoing, Progressing     Problem: Pain (Postpartum Vaginal Delivery)  Goal: Acceptable Pain Control  Outcome: Ongoing, Progressing   Patient vital signs stable. Ambulating independently. Able to void and empty bladder. Fundus firm midline. Managing pain with Ibuprofen and Tylenol. Will continue to monitor and follow plan of care.

## 2022-08-07 NOTE — PLAN OF CARE
Problem: Plan of Care - These are the overarching goals to be used throughout the patient stay.    Goal: Plan of Care Review/Shift Note  Description: The Plan of Care Review/Shift note should be completed every shift.  The Outcome Evaluation is a brief statement about your assessment that the patient is improving, declining, or no change.  This information will be displayed automatically on your shift note.  Outcome: Ongoing, Progressing  Flowsheets (Taken 8/7/2022 0916)  Plan of Care Reviewed With: patient     Problem: Plan of Care - These are the overarching goals to be used throughout the patient stay.    Goal: Readiness for Transition of Care  Outcome: Ongoing, Progressing     Problem: Urinary Retention (Postpartum Vaginal Delivery)  Goal: Effective Urinary Elimination  Outcome: Ongoing, Progressing     Problem: Plan of Care - These are the overarching goals to be used throughout the patient stay.    Goal: Optimal Comfort and Wellbeing  Intervention: Provide Person-Centered Care  Recent Flowsheet Documentation  Taken 8/7/2022 0800 by Lopez Menendez, RN  Trust Relationship/Rapport:    care explained    choices provided    emotional support provided

## 2022-08-07 NOTE — PLAN OF CARE
Problem: Plan of Care - These are the overarching goals to be used throughout the patient stay.    Goal: Plan of Care Review/Shift Note  Description: The Plan of Care Review/Shift note should be completed every shift.  The Outcome Evaluation is a brief statement about your assessment that the patient is improving, declining, or no change.  This information will be displayed automatically on your shift note.  Outcome: Met   Ready for discharge

## 2022-09-25 ENCOUNTER — HEALTH MAINTENANCE LETTER (OUTPATIENT)
Age: 32
End: 2022-09-25

## 2023-06-04 ENCOUNTER — HEALTH MAINTENANCE LETTER (OUTPATIENT)
Age: 33
End: 2023-06-04

## 2024-07-20 ENCOUNTER — HEALTH MAINTENANCE LETTER (OUTPATIENT)
Age: 34
End: 2024-07-20

## 2025-08-09 ENCOUNTER — HEALTH MAINTENANCE LETTER (OUTPATIENT)
Age: 35
End: 2025-08-09